# Patient Record
Sex: FEMALE | Race: BLACK OR AFRICAN AMERICAN | NOT HISPANIC OR LATINO | URBAN - METROPOLITAN AREA
[De-identification: names, ages, dates, MRNs, and addresses within clinical notes are randomized per-mention and may not be internally consistent; named-entity substitution may affect disease eponyms.]

---

## 2018-01-14 ENCOUNTER — EMERGENCY (EMERGENCY)
Facility: HOSPITAL | Age: 83
LOS: 0 days | Discharge: HOME | End: 2018-01-14
Attending: EMERGENCY MEDICINE
Payer: SELF-PAY

## 2018-01-14 VITALS
HEART RATE: 56 BPM | OXYGEN SATURATION: 99 % | SYSTOLIC BLOOD PRESSURE: 177 MMHG | TEMPERATURE: 98 F | DIASTOLIC BLOOD PRESSURE: 79 MMHG | RESPIRATION RATE: 18 BRPM

## 2018-01-14 VITALS
OXYGEN SATURATION: 98 % | TEMPERATURE: 98 F | SYSTOLIC BLOOD PRESSURE: 156 MMHG | DIASTOLIC BLOOD PRESSURE: 80 MMHG | RESPIRATION RATE: 17 BRPM | HEART RATE: 62 BPM

## 2018-01-14 DIAGNOSIS — M25.511 PAIN IN RIGHT SHOULDER: ICD-10-CM
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
## 2018-01-14 PROCEDURE — 99284 EMERGENCY DEPT VISIT MOD MDM: CPT | Mod: 25

## 2018-01-14 PROCEDURE — 73030 X-RAY EXAM OF SHOULDER: CPT | Mod: 26,RT

## 2018-01-14 PROCEDURE — 99053 MED SERV 10PM-8AM 24 HR FAC: CPT

## 2018-01-14 PROCEDURE — 73060 X-RAY EXAM OF HUMERUS: CPT | Mod: 26,RT

## 2018-01-14 RX ORDER — ACETAMINOPHEN 500 MG
650 TABLET ORAL ONCE
Refills: 0 | Status: COMPLETED | OUTPATIENT
Start: 2018-01-14 | End: 2018-01-14

## 2018-01-14 RX ADMIN — Medication 650 MILLIGRAM(S): at 08:33

## 2018-01-14 NOTE — ED PROVIDER NOTE - OBJECTIVE STATEMENT
81 y/o female hx htn p/w right shoulder/arm pain s/p mechanical fall at [facility]. Did not hit head, no loc, no a/c, no neck/back pain, no cp, sob, abd pain, or other extremity pain. Ambulatory since event. Difficulty moving arm/lifting.     ROS: No fever/chills. No eye pain/changes in vision, No ear pain/sore throat/dysphagia, No chest pain/palpitations. No SOB/cough/. No abdominal pain, N/V/D, no black/bloody bm. No dysuria/frequency/discharge, No headache. No Dizziness.    No rashes or breaks in skin. No numbness/tingling/weakness.

## 2018-01-14 NOTE — ED PROVIDER NOTE - PROGRESS NOTE DETAILS
Rios: pt with some improvement, no fracture/dislocation, limited rom, no sign compartment syndrome, spoke to ortho, will have pt f/u with Dr. Ochoa- pt to call today, possible mri, sling prn but given instructions to prevent frozen shoulder, supportive care stable for d/c.

## 2018-01-14 NOTE — ED PROVIDER NOTE - PHYSICAL EXAMINATION
Gen: No acute distress, non toxic  HEENT: Mucous membranes moist, pink conjunctivae, EOMI ncat.   CV: RRR, nl s1/s2.  Resp: CTAB, normal rate and effort  GI: Abdomen soft, NT, ND. No rebound, no guarding  : No CVAT  Neuro: A&O x 3, moving all 4 extremities  MSK: severe pain to right proximal UE, ?swelling/minor fullness. normal sensation, unable to lift arm due to pain, normal motor/sensation distally. no ttp over clavicle or other part of extremity  Skin: No rashes. intact and perfused.

## 2018-01-14 NOTE — ED PROVIDER NOTE - MEDICAL DECISION MAKING DETAILS
mechanical fall with right UE injury, r/o fracture, did not hit head, no headache or other pains. external sign of head trauma, no indication for head ct/cspine at this time.  xray r/o fracture. pain control.

## 2018-08-30 NOTE — ED ADULT TRIAGE NOTE - MEANS OF ARRIVAL
Before Your Surgery      Call your surgeon if there is any change in your health. This includes signs of a cold or flu (such as a sore throat, runny nose, cough, rash or fever).    Do not smoke, drink alcohol or take over the counter medicine (unless your surgeon or primary care doctor tells you to) for the 24 hours before and after surgery.    If you take prescribed drugs: Follow your doctor s orders about which medicines to take and which to stop until after surgery.    Eating and drinking prior to surgery: follow the instructions from your surgeon    Take a shower or bath the night before surgery. Use the soap your surgeon gave you to gently clean your skin. If you do not have soap from your surgeon, use your regular soap. Do not shave or scrub the surgery site.  Wear clean pajamas and have clean sheets on your bed.     QUIT SMOKING!!     -- Choose a quit date (within 1 month). Quitting smoking abruptly is more successful than gradually cutting back.   -- Tell everyone about it (friends, family, coworkers)   -- Think about when you smoke the most, and what you'll do during those times (eg when in the car, work breaks, etc)     Consider:   -- Start varenicline (Chantix) 1 week before your quit date   -- Start bupropion (Wellbutrin/Zyban) 1 week before your quit date -- Welbutrin 1 pill daily for 1 week then 1 pill twice daily      -- Stop smoking on quit date   -- Starting with quit date, use nicotine lozenges/gum as needed for cravings     -- Quit Plan - Call them in the next 1-2 weeks to help you quit smoking.                        [phone] ([phone]                        http://www.quitplan.com   -- http://smokefree.gov/    Bring your CPAP with to surgery.   
stretcher

## 2019-03-14 ENCOUNTER — APPOINTMENT (OUTPATIENT)
Dept: VASCULAR SURGERY | Facility: CLINIC | Age: 84
End: 2019-03-14
Payer: MEDICARE

## 2019-03-14 VITALS
DIASTOLIC BLOOD PRESSURE: 81 MMHG | BODY MASS INDEX: 34 KG/M2 | SYSTOLIC BLOOD PRESSURE: 164 MMHG | HEIGHT: 58 IN | HEART RATE: 81 BPM | WEIGHT: 162 LBS | TEMPERATURE: 98.2 F

## 2019-03-14 VITALS — HEART RATE: 65 BPM | SYSTOLIC BLOOD PRESSURE: 162 MMHG | DIASTOLIC BLOOD PRESSURE: 81 MMHG

## 2019-03-14 DIAGNOSIS — M79.89 OTHER SPECIFIED SOFT TISSUE DISORDERS: ICD-10-CM

## 2019-03-14 PROCEDURE — 99203 OFFICE O/P NEW LOW 30 MIN: CPT

## 2019-03-14 PROCEDURE — 93970 EXTREMITY STUDY: CPT

## 2019-04-01 ENCOUNTER — OTHER (OUTPATIENT)
Age: 84
End: 2019-04-01

## 2019-04-01 RX ORDER — AMLODIPINE BESYLATE 5 MG/1
TABLET ORAL
Refills: 0 | Status: ACTIVE | COMMUNITY

## 2019-04-01 RX ORDER — ALPRAZOLAM 0.5 MG/1
0.5 TABLET ORAL
Qty: 2 | Refills: 0 | Status: COMPLETED | COMMUNITY
Start: 2019-04-01 | End: 2019-04-02

## 2019-04-01 RX ORDER — CEPHALEXIN 500 MG/1
500 CAPSULE ORAL
Refills: 0 | Status: ACTIVE | COMMUNITY

## 2019-04-01 RX ORDER — SODIUM BICARBONATE 84 MG/ML
8.4 INJECTION, SOLUTION INTRAVENOUS
Qty: 5 | Refills: 0 | Status: COMPLETED | COMMUNITY
Start: 2019-04-01 | End: 2019-04-05

## 2019-04-01 RX ORDER — SODIUM CHLORIDE 9 G/ML
0.9 INJECTION, SOLUTION INTRAVENOUS
Qty: 500 | Refills: 0 | Status: COMPLETED | COMMUNITY
Start: 2019-04-01 | End: 2019-04-05

## 2019-04-01 RX ORDER — HYDROGEN PEROXIDE 2.65 ML/100ML
81 LIQUID ORAL; TOPICAL
Refills: 0 | Status: ACTIVE | COMMUNITY

## 2019-04-01 RX ORDER — IBUPROFEN 800 MG/1
TABLET, FILM COATED ORAL
Refills: 0 | Status: ACTIVE | COMMUNITY

## 2019-04-01 RX ORDER — LIDOCAINE HYDROCHLORIDE 10 MG/ML
1 INJECTION, SOLUTION INFILTRATION; PERINEURAL
Qty: 50 | Refills: 0 | Status: COMPLETED | COMMUNITY
Start: 2019-04-01 | End: 2019-04-05

## 2019-04-05 ENCOUNTER — APPOINTMENT (OUTPATIENT)
Dept: VASCULAR SURGERY | Facility: CLINIC | Age: 84
End: 2019-04-05
Payer: MEDICARE

## 2019-04-05 PROCEDURE — 36475 ENDOVENOUS RF 1ST VEIN: CPT | Mod: RT

## 2019-04-05 NOTE — PROCEDURE
[FreeTextEntry1] : right GSV RFA [FreeTextEntry3] : Procedural safety checklist and time out completed:\par Confirmed patient identification (Patient Name, , and/or medical record number including when possible affirmation by patient or parent/family/other.\par Confirmed procedure with the patient. Consent present, accurate and signed. \par Confirmed special equipment and supplies are present.\par Sterility confirmed. Position verified. \par Site/ side is marked and visible and confirmed. \par Procedure confirmed by consent. Accurate consent including side and site.\par Review of medical records noting correct procedure including site and side.\par MD/PA verifies presence and review of imaging studies and or written report of imaging studies.\par Specify equipment are available for the planned procedure.\par MD/PA has marked the patient's procedural site and side.\par Agreement on the procedure to be performed\par Time out completed.\par All of the above has been confirmed by the team.\par All patient-specific concerns have been addressed\par \par Indication: Right lower extremity varicose veins with leg pain, leg swelling, and leg cramping.  Venous insufficiency/ reflux.\par \par Procedure: radiofrequency ablation of the right great saphenous vein. \par 	\par [Ms. MAGALI CARRION is a 83 year old F with a history of right lower extremity varicose veins previously seen in the office.  Ultrasound examination demonstrated venous insufficiency. A trial of compression stockings, exercise, elevation, and pain medication was attempted without relief and definitive treatment with radiofrequency ablation was offered. \par \par The patient has come for radiofrequency ablation treatment of the right great saphenous vein. \par I have discussed the risks of the procedure at length with the patient. The risks discussed were inclusive of but not limited to infection, irritation at the site of infiltration of local anesthesia, and also rare risk of deep venous thrombosis and pulmonary emboli. The patient agrees to proceed with the procedure. \par The patient was escorted into the procedure room and a time out called.\par The entire limb was prepped and draped in sterile fashion. The RF fiber was placed on the sterile field and connected by a sterile cable. Actuation, temperature, and impedance testing were performed to ensure that all components were connected and operating properly. \par The patient was placed on the procedure table and local anesthesia was instilled in the skin overlying the access site. Under ultrasound guidance, the vein was punctured with a micropuncture needle, using the anterior wall technique. A guide wire was now introduced through the needle, and the needle was then exchanged over the guide wire for a 7F sheath. The guide wire was removed and the RF probe was then placed into the right great saphenous vein through the sheath and position confirmed using ultrasound guidance. After the RF probe position was verified by ultrasound, tumescent anesthesia consisting of normal saline, 1% lidocaine with 8.4% sodium bicarbonate was infiltrated, under ultrasound guidance, precisely into the perivenous compartment along the entire length of the vein until a “halo” of fluid was noted around the vein. After RF probe position was again confirmed with ultrasound imaging, RF energy was applied. The probe was gradually and carefully withdrawn at a rate of 6.5cm/20seconds. \par \par The total volume injected was 100_cc. \par Total treatment time was 80 seconds.\par Treatment length was 20 cm and 4 cycles of RF performed using the 7 cm probe. \par The probe is 3.5 cm from the SFJ.\par \par Estimated Blood Loss: minimal\par Repeat ultrasound of the treated vein was performed confirming successful treatment. The catheter and sheath were withdrawn and hemostasis established with direct pressure. After assuring hemostasis, a sterile 4x4 was placed on the access site and an ACE compression wrap was applied. Patient tolerated procedure well. Patient was given post-procedure instructions and follow up appointment was scheduled.\par \par \par

## 2019-04-08 ENCOUNTER — OTHER (OUTPATIENT)
Age: 84
End: 2019-04-08

## 2019-04-08 RX ORDER — SODIUM CHLORIDE 9 G/ML
0.9 INJECTION, SOLUTION INTRAVENOUS
Qty: 500 | Refills: 0 | Status: ACTIVE | COMMUNITY
Start: 2019-04-08 | End: 1900-01-01

## 2019-04-08 RX ORDER — SODIUM BICARBONATE 84 MG/ML
8.4 INJECTION, SOLUTION INTRAVENOUS
Qty: 5 | Refills: 0 | Status: ACTIVE | COMMUNITY
Start: 2019-04-08 | End: 1900-01-01

## 2019-04-08 RX ORDER — LIDOCAINE HYDROCHLORIDE 10 MG/ML
1 INJECTION, SOLUTION INFILTRATION; PERINEURAL
Qty: 50 | Refills: 0 | Status: ACTIVE | COMMUNITY
Start: 2019-04-08 | End: 1900-01-01

## 2019-04-12 ENCOUNTER — APPOINTMENT (OUTPATIENT)
Dept: VASCULAR SURGERY | Facility: CLINIC | Age: 84
End: 2019-04-12
Payer: MEDICARE

## 2019-04-12 PROCEDURE — 93971 EXTREMITY STUDY: CPT | Mod: 59

## 2019-04-12 PROCEDURE — 36475 ENDOVENOUS RF 1ST VEIN: CPT | Mod: LT

## 2019-04-12 NOTE — PROCEDURE
[FreeTextEntry1] : left GSV RFA [FreeTextEntry3] : Patient is s/p right GSV RFA. Post procedure scan done today shows successful closure of the right GSV without evidence of DVT. She is here today for the lef GSV RFA.\par \par Procedural safety checklist and time out completed:\par Confirmed patient identification (Patient Name, , and/or medical record number including when possible affirmation by patient or parent/family/other.\par Confirmed procedure with the patient. Consent present, accurate and signed. \par Confirmed special equipment and supplies are present.\par Sterility confirmed. Position verified. \par Site/ side is marked and visible and confirmed. \par Procedure confirmed by consent. Accurate consent including side and site.\par Review of medical records noting correct procedure including site and side.\par MD/PA verifies presence and review of imaging studies and or written report of imaging studies.\par Specify equipment are available for the planned procedure.\par MD/PA has marked the patient's procedural site and side.\par Agreement on the procedure to be performed\par Time out completed.\par All of the above has been confirmed by the team.\par All patient-specific concerns have been addressed\par \par Indication: Left lower extremity varicose veins with leg pain, leg swelling, and leg cramping.  Venous insufficiency/ reflux.\par \par Procedure: radiofrequency ablation of the left great saphenous vein. \par 	\par [Ms. MAGALI CARRION is a 83 year old F with a history of left lower extremity varicose veins previously seen in the office.  Ultrasound examination demonstrated venous insufficiency. A trial of compression stockings, exercise, elevation, and pain medication was attempted without relief and definitive treatment with radiofrequency ablation was offered. \par \par The patient has come for radiofrequency ablation treatment of the left great saphenous vein. \par I have discussed the risks of the procedure at length with the patient. The risks discussed were inclusive of but not limited to infection, irritation at the site of infiltration of local anesthesia, and also rare risk of deep venous thrombosis and pulmonary emboli. The patient agrees to proceed with the procedure. \par The patient was escorted into the procedure room and a time out called.\par The entire limb was prepped and draped in sterile fashion. The RF fiber was placed on the sterile field and connected by a sterile cable. Actuation, temperature, and impedance testing were performed to ensure that all components were connected and operating properly. \par The patient was placed on the procedure table and local anesthesia was instilled in the skin overlying the access site. Under ultrasound guidance, the vein was punctured with a micropuncture needle, using the anterior wall technique. A guide wire was now introduced through the needle, and the needle was then exchanged over the guide wire for a 7F sheath. The guide wire was removed and the RF probe was then placed into the left great saphenous vein through the sheath and position confirmed using ultrasound guidance. After the RF probe position was verified by ultrasound, tumescent anesthesia consisting of normal saline, 1% lidocaine with 8.4% sodium bicarbonate was infiltrated, under ultrasound guidance, precisely into the perivenous compartment along the entire length of the vein until a “halo” of fluid was noted around the vein. After RF probe position was again confirmed with ultrasound imaging, RF energy was applied. The probe was gradually and carefully withdrawn at a rate of 6.5cm/20seconds. \par \par The total volume injected was 100 cc. \par Total treatment time was 60_seconds.\par Treatment length was 13 cm and 3 cycles of RF performed using the 7 cm probe. \par The probe is 3.9 cm from the SFJ.\par \par Estimated Blood Loss: minimal\par Repeat ultrasound of the treated vein was performed confirming successful treatment. The catheter and sheath were withdrawn and hemostasis established with direct pressure. After assuring hemostasis, a sterile 4x4 was placed on the access site and an ACE compression wrap was applied. Patient tolerated procedure well. Patient was given post-procedure instructions and follow up appointment was scheduled.\par \par \par

## 2019-04-15 ENCOUNTER — APPOINTMENT (OUTPATIENT)
Dept: VASCULAR SURGERY | Facility: CLINIC | Age: 84
End: 2019-04-15
Payer: MEDICARE

## 2019-04-15 PROCEDURE — 93971 EXTREMITY STUDY: CPT

## 2019-05-10 ENCOUNTER — APPOINTMENT (OUTPATIENT)
Dept: VASCULAR SURGERY | Facility: CLINIC | Age: 84
End: 2019-05-10
Payer: MEDICARE

## 2019-05-10 VITALS
HEART RATE: 73 BPM | WEIGHT: 162 LBS | SYSTOLIC BLOOD PRESSURE: 150 MMHG | BODY MASS INDEX: 34 KG/M2 | HEIGHT: 58 IN | DIASTOLIC BLOOD PRESSURE: 83 MMHG | TEMPERATURE: 98.1 F

## 2019-05-10 DIAGNOSIS — I83.893 VARICOSE VEINS OF BILATERAL LOWER EXTREMITIES WITH OTHER COMPLICATIONS: ICD-10-CM

## 2019-05-10 PROCEDURE — 93970 EXTREMITY STUDY: CPT

## 2019-05-10 PROCEDURE — 99212 OFFICE O/P EST SF 10 MIN: CPT

## 2019-05-29 NOTE — HISTORY OF PRESENT ILLNESS
[FreeTextEntry1] : 83 year old female presents today for follow up s/p bilateral RFA. Post-ablation ultrasounds demonstrated successful closure of GSVs and no resultant DVT. Patient reports that after the procedures her legs appeared to have improved but she suddenly started having swelling and pain again the beginning of this week. She now has difficulty wearing her shoes. She reports swelling is worse at the end of the day and unimproved with leg elevation. She denies any sort of inciting event including trauma, prolonged activity, recent illness, or travel. Denies claudication, rest pain, and tissue loss. \par \par PMH: HTN, arthritis, Alzheimers \par \par Bilateral VLE from today negative for DVT. GSV is closed bilaterally. No other reflux noted. \par \par

## 2019-05-29 NOTE — ASSESSMENT
[FreeTextEntry1] : 83 year old female presents today for follow up s/p bilateral RFA. Post-ablation ultrasounds demonstrated successful closure of GSVs and no resultant DVT. Patient reports that after the procedures her legs appeared to have improved but she suddenly started having swelling and pain again the beginning of this week. She now has difficulty wearing her shoes. She reports swelling is worse at the end of the day and unimproved with leg elevation. She denies any sort of inciting event including trauma, prolonged activity, recent illness, or travel. Denies claudication, rest pain, and tissue loss. \par \par PMH: HTN, arthritis, Alzheimers \par \par Bilateral VLE from today negative for DVT. GSV is closed bilaterally. No other reflux noted. \par \par On exam, the legs are warm with normal capillary refill. Skin is intact. There is 1+ pitting edema bilaterally. RFA incision are healed bilaterally. \par \par Recommend lymphedema therapy. Will refer to [facility]. Follow up as needed. \par

## 2019-05-29 NOTE — PHYSICAL EXAM
[Ankle Swelling (On Exam)] : present [Alert] : alert [Ankle Swelling Bilaterally] : bilaterally  [Oriented to Person] : oriented to person [Oriented to Place] : oriented to place [Oriented to Time] : oriented to time [Calm] : calm [de-identified] : NAD [de-identified] : unlabored [FreeTextEntry1] : Legs warm with normal capillary refill. 1+ pitting edema bilaterally.  [de-identified] : LE intact. RFA incisions intact

## 2019-05-29 NOTE — REVIEW OF SYSTEMS
[Lower Ext Edema] : lower extremity edema [Negative] : Constitutional [Leg Claudication] : no intermittent leg claudication [Shortness Of Breath] : no shortness of breath [Abdominal Pain] : no abdominal pain [Difficulty Walking] : no difficulty walking [FreeTextEntry9] : bilateral leg pain

## 2019-05-29 NOTE — ADDENDUM
[FreeTextEntry1] : Patient has primary lymphedema. Patient has been compliant with conservative therapies including compression and elevation for more than 30 days. Early signs of hyperpigmentation is noted.\par \par \par

## 2020-01-08 ENCOUNTER — INPATIENT (INPATIENT)
Facility: HOSPITAL | Age: 85
LOS: 4 days | Discharge: HOME | End: 2020-01-13
Attending: GENERAL ACUTE CARE HOSPITAL | Admitting: GENERAL ACUTE CARE HOSPITAL
Payer: MEDICARE

## 2020-01-08 VITALS
SYSTOLIC BLOOD PRESSURE: 151 MMHG | RESPIRATION RATE: 20 BRPM | DIASTOLIC BLOOD PRESSURE: 79 MMHG | HEART RATE: 65 BPM | WEIGHT: 169.98 LBS | OXYGEN SATURATION: 97 % | HEIGHT: 59 IN | TEMPERATURE: 99 F

## 2020-01-08 LAB
ALBUMIN SERPL ELPH-MCNC: 3.6 G/DL — SIGNIFICANT CHANGE UP (ref 3.3–5)
ALP SERPL-CCNC: 95 U/L — SIGNIFICANT CHANGE UP (ref 40–120)
ALT FLD-CCNC: 17 U/L — SIGNIFICANT CHANGE UP (ref 12–78)
ANION GAP SERPL CALC-SCNC: 5 MMOL/L — SIGNIFICANT CHANGE UP (ref 5–17)
APPEARANCE UR: CLEAR — SIGNIFICANT CHANGE UP
AST SERPL-CCNC: 34 U/L — SIGNIFICANT CHANGE UP (ref 15–37)
BACTERIA # UR AUTO: ABNORMAL
BASOPHILS # BLD AUTO: 0.03 K/UL — SIGNIFICANT CHANGE UP (ref 0–0.2)
BASOPHILS NFR BLD AUTO: 0.8 % — SIGNIFICANT CHANGE UP (ref 0–2)
BILIRUB SERPL-MCNC: 0.7 MG/DL — SIGNIFICANT CHANGE UP (ref 0.2–1.2)
BILIRUB UR-MCNC: NEGATIVE — SIGNIFICANT CHANGE UP
BUN SERPL-MCNC: 12 MG/DL — SIGNIFICANT CHANGE UP (ref 7–23)
CALCIUM SERPL-MCNC: 9 MG/DL — SIGNIFICANT CHANGE UP (ref 8.5–10.1)
CHLORIDE SERPL-SCNC: 106 MMOL/L — SIGNIFICANT CHANGE UP (ref 96–108)
CO2 SERPL-SCNC: 29 MMOL/L — SIGNIFICANT CHANGE UP (ref 22–31)
COLOR SPEC: YELLOW — SIGNIFICANT CHANGE UP
CREAT SERPL-MCNC: 1.17 MG/DL — SIGNIFICANT CHANGE UP (ref 0.5–1.3)
DIFF PNL FLD: ABNORMAL
EOSINOPHIL # BLD AUTO: 0.19 K/UL — SIGNIFICANT CHANGE UP (ref 0–0.5)
EOSINOPHIL NFR BLD AUTO: 4.8 % — SIGNIFICANT CHANGE UP (ref 0–6)
EPI CELLS # UR: SIGNIFICANT CHANGE UP
FLU A RESULT: SIGNIFICANT CHANGE UP
FLU A RESULT: SIGNIFICANT CHANGE UP
FLUAV AG NPH QL: SIGNIFICANT CHANGE UP
FLUBV AG NPH QL: SIGNIFICANT CHANGE UP
GLUCOSE SERPL-MCNC: 94 MG/DL — SIGNIFICANT CHANGE UP (ref 70–99)
GLUCOSE UR QL: NEGATIVE MG/DL — SIGNIFICANT CHANGE UP
HCT VFR BLD CALC: 44.1 % — SIGNIFICANT CHANGE UP (ref 34.5–45)
HGB BLD-MCNC: 13.6 G/DL — SIGNIFICANT CHANGE UP (ref 11.5–15.5)
IMM GRANULOCYTES NFR BLD AUTO: 0.3 % — SIGNIFICANT CHANGE UP (ref 0–1.5)
KETONES UR-MCNC: NEGATIVE — SIGNIFICANT CHANGE UP
LEUKOCYTE ESTERASE UR-ACNC: ABNORMAL
LYMPHOCYTES # BLD AUTO: 1.28 K/UL — SIGNIFICANT CHANGE UP (ref 1–3.3)
LYMPHOCYTES # BLD AUTO: 32.4 % — SIGNIFICANT CHANGE UP (ref 13–44)
MCHC RBC-ENTMCNC: 27.9 PG — SIGNIFICANT CHANGE UP (ref 27–34)
MCHC RBC-ENTMCNC: 30.8 GM/DL — LOW (ref 32–36)
MCV RBC AUTO: 90.4 FL — SIGNIFICANT CHANGE UP (ref 80–100)
MONOCYTES # BLD AUTO: 0.48 K/UL — SIGNIFICANT CHANGE UP (ref 0–0.9)
MONOCYTES NFR BLD AUTO: 12.2 % — SIGNIFICANT CHANGE UP (ref 2–14)
NEUTROPHILS # BLD AUTO: 1.96 K/UL — SIGNIFICANT CHANGE UP (ref 1.8–7.4)
NEUTROPHILS NFR BLD AUTO: 49.5 % — SIGNIFICANT CHANGE UP (ref 43–77)
NITRITE UR-MCNC: NEGATIVE — SIGNIFICANT CHANGE UP
NRBC # BLD: 0 /100 WBCS — SIGNIFICANT CHANGE UP (ref 0–0)
PH UR: 7 — SIGNIFICANT CHANGE UP (ref 5–8)
PLATELET # BLD AUTO: 246 K/UL — SIGNIFICANT CHANGE UP (ref 150–400)
POTASSIUM SERPL-MCNC: 4.4 MMOL/L — SIGNIFICANT CHANGE UP (ref 3.5–5.3)
POTASSIUM SERPL-SCNC: 4.4 MMOL/L — SIGNIFICANT CHANGE UP (ref 3.5–5.3)
PROT SERPL-MCNC: 7.8 GM/DL — SIGNIFICANT CHANGE UP (ref 6–8.3)
PROT UR-MCNC: 15 MG/DL
RBC # BLD: 4.88 M/UL — SIGNIFICANT CHANGE UP (ref 3.8–5.2)
RBC # FLD: 13.3 % — SIGNIFICANT CHANGE UP (ref 10.3–14.5)
RBC CASTS # UR COMP ASSIST: SIGNIFICANT CHANGE UP /HPF (ref 0–4)
RSV RESULT: DETECTED
RSV RNA RESP QL NAA+PROBE: DETECTED
SODIUM SERPL-SCNC: 140 MMOL/L — SIGNIFICANT CHANGE UP (ref 135–145)
SP GR SPEC: 1 — LOW (ref 1.01–1.02)
UROBILINOGEN FLD QL: NEGATIVE MG/DL — SIGNIFICANT CHANGE UP
WBC # BLD: 3.95 K/UL — SIGNIFICANT CHANGE UP (ref 3.8–10.5)
WBC # FLD AUTO: 3.95 K/UL — SIGNIFICANT CHANGE UP (ref 3.8–10.5)
WBC UR QL: SIGNIFICANT CHANGE UP

## 2020-01-08 PROCEDURE — 71045 X-RAY EXAM CHEST 1 VIEW: CPT | Mod: 26

## 2020-01-08 PROCEDURE — 99285 EMERGENCY DEPT VISIT HI MDM: CPT

## 2020-01-08 RX ORDER — SODIUM CHLORIDE 9 MG/ML
1000 INJECTION, SOLUTION INTRAVENOUS ONCE
Refills: 0 | Status: COMPLETED | OUTPATIENT
Start: 2020-01-08 | End: 2020-01-08

## 2020-01-08 RX ORDER — ACETAMINOPHEN 500 MG
2 TABLET ORAL
Qty: 40 | Refills: 0
Start: 2020-01-08 | End: 2020-01-12

## 2020-01-08 RX ORDER — ACETAMINOPHEN 500 MG
975 TABLET ORAL ONCE
Refills: 0 | Status: COMPLETED | OUTPATIENT
Start: 2020-01-08 | End: 2020-01-09

## 2020-01-08 RX ORDER — CEFUROXIME AXETIL 250 MG
1 TABLET ORAL
Qty: 20 | Refills: 0
Start: 2020-01-08 | End: 2020-01-17

## 2020-01-08 RX ORDER — ALBUTEROL 90 UG/1
2.5 AEROSOL, METERED ORAL ONCE
Refills: 0 | Status: COMPLETED | OUTPATIENT
Start: 2020-01-08 | End: 2020-01-08

## 2020-01-08 RX ADMIN — ALBUTEROL 2.5 MILLIGRAM(S): 90 AEROSOL, METERED ORAL at 20:18

## 2020-01-08 RX ADMIN — Medication 200 MILLIGRAM(S): at 20:19

## 2020-01-08 RX ADMIN — SODIUM CHLORIDE 1000 MILLILITER(S): 9 INJECTION, SOLUTION INTRAVENOUS at 20:19

## 2020-01-08 RX ADMIN — SODIUM CHLORIDE 1000 MILLILITER(S): 9 INJECTION, SOLUTION INTRAVENOUS at 21:19

## 2020-01-08 NOTE — ED PROVIDER NOTE - PROGRESS NOTE DETAILS
Results reported to patient--grossly benign, labs show rsv, UTI, otherwise benign results   Pt. reports feeling better after meds  pt. agrees to f/u with primary care outpt.  pt. understands to return to ED if symptoms worsen; will d/c with meds family explains, pt. lives alone, can't care for self, has alzheimers and can't  meds  Pt. is additionally Rsv positive and has a UTI  will admit to Dr. Correa

## 2020-01-08 NOTE — ED ADULT NURSE NOTE - OBJECTIVE STATEMENT
85y/o female PMHx HTN presents to the ED with flu like symptoms patient reports chills, wet cough with yellow sputum since saturday. advil given at 4pm, slight b/l wheezing, denies respiratory history 85y/o female AAOX2  PMHx HTN presents to the ED with flu like symptoms patient reports chills, wet cough with yellow sputum since saturday. advil given at 4pm, slight b/l wheezing, denies respiratory history

## 2020-01-08 NOTE — ED ADULT NURSE NOTE - ED STAT RN HANDOFF DETAILS
Report received from RN at this time. Assessment available on [facility]. will continue to monitor

## 2020-01-08 NOTE — ED ADULT TRIAGE NOTE - CHIEF COMPLAINT QUOTE
PT C/O COUGH , chills, body aches, and wheezing started on Sunday , hx dementia, pt received aleve at 4pm for fever

## 2020-01-08 NOTE — ED PROVIDER NOTE - OBJECTIVE STATEMENT
83 yo F with flu symptoms, presents from home, brought in with family, who explain she's had a cough, chills, fever, congestion, body aches, headache, chills for  4 days.  + sick contact.  Pt. has baseline dementia and has difficulty giving history.  Pt. felt likely she was wheezing.  No other complaints or inciting event.  Pt. feels well otherwise.   ROS: negative for chest pain, shortness of breath, abd pain, nausea, vomiting, diarrhea, rash, paresthesia, and focal weakness--all other systems reviewed are negative.   PMH: alzheimer's, HTN, anemia; Meds: folic acid, amlopidine, ASA; SH: Denies smoking/drinking/drug use

## 2020-01-08 NOTE — ED ADULT NURSE REASSESSMENT NOTE - NS ED NURSE REASSESS COMMENT FT1
AO X3 , VITAL RE ASSESSED, NO SIGN OF DISTRESS , FAMILY BY HER SIDE , WILL CONTINUE MONITORING TILL PLACED IN THE MAIN

## 2020-01-08 NOTE — ED PROVIDER NOTE - PATIENT PORTAL LINK FT
You can access the FollowMyHealth Patient Portal offered by [facility] by registering at the following website: [email or web address]. By joining [employer] [facility] FollowMyHealth portal, you will also be able to view your health information using other applications (apps) compatible with our system.

## 2020-01-08 NOTE — ED ADULT NURSE NOTE - ED STAT RN HANDOFF DETAILS 2
Report endorsed to oncoming hold RN. Safety checks compld this shift/Safety rounds completed hourly.  IV sites checked Q2+remains WDL. Meds given as ord with no s/s of adverse RXNs. Fall +skin precs in place. Any issues endorsed to oncoming RN for follow up.

## 2020-01-08 NOTE — ED PROVIDER NOTE - CLINICAL SUMMARY MEDICAL DECISION MAKING FREE TEXT BOX
85 yo F with likely flu, doubt pna, uti  -basic labs, ua, cx, flu swab, cxr, iv, LR hydration bolus, tylenol, albuterol x1, robitussin  -f/u results, reeval

## 2020-01-08 NOTE — ED PROVIDER NOTE - PHYSICAL EXAMINATION
Vitals: WNL  Gen: AAOx2, NAD, sitting comfortably in stretcher, calm, cooperative, non-toxic   Head: ncat, perrla, eomi b/l  Neck: supple, no lymphadenopathy, no midline deviation  Heart: rrr, no m/r/g  Lungs: CTA b/l, no rales/ronchi/wheezes  Abd: soft, nontender, non-distended, no rebound or guarding  Ext: no clubbing/cyanosis/edema  Neuro: sensation and muscle strength intact b/l, steady gait

## 2020-01-08 NOTE — ED PROVIDER NOTE - CARE PLAN
Principal Discharge DX:	Influenza Principal Discharge DX:	RSV infection  Secondary Diagnosis:	Acute cystitis without hematuria Principal Discharge DX:	RSV infection  Secondary Diagnosis:	Acute cystitis without hematuria  Secondary Diagnosis:	Altered mental status, unspecified altered mental status type

## 2020-01-09 DIAGNOSIS — G30.9 ALZHEIMER'S DISEASE, UNSPECIFIED: ICD-10-CM

## 2020-01-09 DIAGNOSIS — N39.0 URINARY TRACT INFECTION, SITE NOT SPECIFIED: ICD-10-CM

## 2020-01-09 DIAGNOSIS — Z90.49 ACQUIRED ABSENCE OF OTHER SPECIFIED PARTS OF DIGESTIVE TRACT: Chronic | ICD-10-CM

## 2020-01-09 DIAGNOSIS — I10 ESSENTIAL (PRIMARY) HYPERTENSION: ICD-10-CM

## 2020-01-09 DIAGNOSIS — B97.4 RESPIRATORY SYNCYTIAL VIRUS AS THE CAUSE OF DISEASES CLASSIFIED ELSEWHERE: ICD-10-CM

## 2020-01-09 LAB
ANION GAP SERPL CALC-SCNC: 6 MMOL/L — SIGNIFICANT CHANGE UP (ref 5–17)
BASOPHILS # BLD AUTO: 0.03 K/UL — SIGNIFICANT CHANGE UP (ref 0–0.2)
BASOPHILS NFR BLD AUTO: 0.8 % — SIGNIFICANT CHANGE UP (ref 0–2)
BUN SERPL-MCNC: 9 MG/DL — SIGNIFICANT CHANGE UP (ref 7–23)
CALCIUM SERPL-MCNC: 8.8 MG/DL — SIGNIFICANT CHANGE UP (ref 8.5–10.1)
CHLORIDE SERPL-SCNC: 106 MMOL/L — SIGNIFICANT CHANGE UP (ref 96–108)
CO2 SERPL-SCNC: 27 MMOL/L — SIGNIFICANT CHANGE UP (ref 22–31)
CREAT SERPL-MCNC: 0.89 MG/DL — SIGNIFICANT CHANGE UP (ref 0.5–1.3)
EOSINOPHIL # BLD AUTO: 0.08 K/UL — SIGNIFICANT CHANGE UP (ref 0–0.5)
EOSINOPHIL NFR BLD AUTO: 2.1 % — SIGNIFICANT CHANGE UP (ref 0–6)
GLUCOSE SERPL-MCNC: 92 MG/DL — SIGNIFICANT CHANGE UP (ref 70–99)
HCT VFR BLD CALC: 41.1 % — SIGNIFICANT CHANGE UP (ref 34.5–45)
HGB BLD-MCNC: 12.7 G/DL — SIGNIFICANT CHANGE UP (ref 11.5–15.5)
IMM GRANULOCYTES NFR BLD AUTO: 0.3 % — SIGNIFICANT CHANGE UP (ref 0–1.5)
LYMPHOCYTES # BLD AUTO: 0.69 K/UL — LOW (ref 1–3.3)
LYMPHOCYTES # BLD AUTO: 18.4 % — SIGNIFICANT CHANGE UP (ref 13–44)
MCHC RBC-ENTMCNC: 27.4 PG — SIGNIFICANT CHANGE UP (ref 27–34)
MCHC RBC-ENTMCNC: 30.9 GM/DL — LOW (ref 32–36)
MCV RBC AUTO: 88.6 FL — SIGNIFICANT CHANGE UP (ref 80–100)
MONOCYTES # BLD AUTO: 0.34 K/UL — SIGNIFICANT CHANGE UP (ref 0–0.9)
MONOCYTES NFR BLD AUTO: 9.1 % — SIGNIFICANT CHANGE UP (ref 2–14)
NEUTROPHILS # BLD AUTO: 2.59 K/UL — SIGNIFICANT CHANGE UP (ref 1.8–7.4)
NEUTROPHILS NFR BLD AUTO: 69.3 % — SIGNIFICANT CHANGE UP (ref 43–77)
NRBC # BLD: 0 /100 WBCS — SIGNIFICANT CHANGE UP (ref 0–0)
PLATELET # BLD AUTO: 244 K/UL — SIGNIFICANT CHANGE UP (ref 150–400)
POTASSIUM SERPL-MCNC: 3.5 MMOL/L — SIGNIFICANT CHANGE UP (ref 3.5–5.3)
POTASSIUM SERPL-SCNC: 3.5 MMOL/L — SIGNIFICANT CHANGE UP (ref 3.5–5.3)
PROCALCITONIN SERPL-MCNC: 0.06 NG/ML — SIGNIFICANT CHANGE UP (ref 0.02–0.1)
RBC # BLD: 4.64 M/UL — SIGNIFICANT CHANGE UP (ref 3.8–5.2)
RBC # FLD: 13.3 % — SIGNIFICANT CHANGE UP (ref 10.3–14.5)
SODIUM SERPL-SCNC: 139 MMOL/L — SIGNIFICANT CHANGE UP (ref 135–145)
WBC # BLD: 3.74 K/UL — LOW (ref 3.8–10.5)
WBC # FLD AUTO: 3.74 K/UL — LOW (ref 3.8–10.5)

## 2020-01-09 PROCEDURE — 99223 1ST HOSP IP/OBS HIGH 75: CPT

## 2020-01-09 RX ORDER — CEFTRIAXONE 500 MG/1
INJECTION, POWDER, FOR SOLUTION INTRAMUSCULAR; INTRAVENOUS
Refills: 0 | Status: DISCONTINUED | OUTPATIENT
Start: 2020-01-09 | End: 2020-01-09

## 2020-01-09 RX ORDER — HALOPERIDOL DECANOATE 100 MG/ML
2 INJECTION INTRAMUSCULAR ONCE
Refills: 0 | Status: COMPLETED | OUTPATIENT
Start: 2020-01-09 | End: 2020-01-09

## 2020-01-09 RX ORDER — HEPARIN SODIUM 5000 [USP'U]/ML
5000 INJECTION INTRAVENOUS; SUBCUTANEOUS EVERY 12 HOURS
Refills: 0 | Status: DISCONTINUED | OUTPATIENT
Start: 2020-01-09 | End: 2020-01-13

## 2020-01-09 RX ORDER — IPRATROPIUM/ALBUTEROL SULFATE 18-103MCG
3 AEROSOL WITH ADAPTER (GRAM) INHALATION EVERY 6 HOURS
Refills: 0 | Status: DISCONTINUED | OUTPATIENT
Start: 2020-01-09 | End: 2020-01-13

## 2020-01-09 RX ORDER — SODIUM CHLORIDE 9 MG/ML
1000 INJECTION INTRAMUSCULAR; INTRAVENOUS; SUBCUTANEOUS
Refills: 0 | Status: DISCONTINUED | OUTPATIENT
Start: 2020-01-09 | End: 2020-01-09

## 2020-01-09 RX ORDER — AMLODIPINE BESYLATE 2.5 MG/1
5 TABLET ORAL DAILY
Refills: 0 | Status: DISCONTINUED | OUTPATIENT
Start: 2020-01-09 | End: 2020-01-11

## 2020-01-09 RX ORDER — CEFTRIAXONE 500 MG/1
1000 INJECTION, POWDER, FOR SOLUTION INTRAMUSCULAR; INTRAVENOUS ONCE
Refills: 0 | Status: COMPLETED | OUTPATIENT
Start: 2020-01-09 | End: 2020-01-09

## 2020-01-09 RX ADMIN — HEPARIN SODIUM 5000 UNIT(S): 5000 INJECTION INTRAVENOUS; SUBCUTANEOUS at 05:57

## 2020-01-09 RX ADMIN — Medication 3 MILLILITER(S): at 11:37

## 2020-01-09 RX ADMIN — HALOPERIDOL DECANOATE 2 MILLIGRAM(S): 100 INJECTION INTRAMUSCULAR at 02:33

## 2020-01-09 RX ADMIN — AMLODIPINE BESYLATE 5 MILLIGRAM(S): 2.5 TABLET ORAL at 05:57

## 2020-01-09 RX ADMIN — HEPARIN SODIUM 5000 UNIT(S): 5000 INJECTION INTRAVENOUS; SUBCUTANEOUS at 18:22

## 2020-01-09 RX ADMIN — CEFTRIAXONE 100 MILLIGRAM(S): 500 INJECTION, POWDER, FOR SOLUTION INTRAMUSCULAR; INTRAVENOUS at 03:07

## 2020-01-09 RX ADMIN — Medication 3 MILLILITER(S): at 05:58

## 2020-01-09 RX ADMIN — Medication 3 MILLILITER(S): at 18:44

## 2020-01-09 RX ADMIN — SODIUM CHLORIDE 100 MILLILITER(S): 9 INJECTION INTRAMUSCULAR; INTRAVENOUS; SUBCUTANEOUS at 05:58

## 2020-01-09 RX ADMIN — Medication 975 MILLIGRAM(S): at 22:05

## 2020-01-09 RX ADMIN — Medication 975 MILLIGRAM(S): at 21:32

## 2020-01-09 NOTE — H&P ADULT - NSHPPHYSICALEXAM_GEN_ALL_CORE
T(C): 37.3 (08 Jan 2020 23:30), Max: 37.3 (08 Jan 2020 23:30)  T(F): 99.1 (08 Jan 2020 23:30), Max: 99.1 (08 Jan 2020 23:30)  HR: 85 (08 Jan 2020 23:30) (58 - 85)  BP: 156/85 (08 Jan 2020 23:30) (138/78 - 156/85)  BP(mean): 112 (08 Jan 2020 19:33) (112 - 112)  RR: 19 (08 Jan 2020 23:30) (17 - 20)  SpO2: 96% (08 Jan 2020 23:30) (96% - 97%)    PHYSICAL EXAM:  GENERAL: NAD, well-groomed, well-developed, pleasant  HEAD:  Atraumatic, Normocephalic  EYES: EOMI, PERRLA, conjunctiva and sclera clear  ENMT: No tonsillar erythema, exudates, or enlargement; Moist mucous membranes,  No lesions  NECK: Supple, No JVD, Normal thyroid  NERVOUS SYSTEM:  Alert & Oriented X2, CN 2-12 intact, no focal deficits  CHEST/LUNG: Clear to percussion bilaterally; No rales, rhonchi, wheezing, or rubs  HEART: Regular rate and rhythm; No murmurs, rubs, or gallops  ABDOMEN: Soft, Nontender, Nondistended; Bowel sounds present  EXTREMITIES:  + Peripheral Pulses, No clubbing, cyanosis, or edema  LYMPH: No lymphadenopathy noted  SKIN: No rashes or lesions

## 2020-01-09 NOTE — H&P ADULT - HISTORY OF PRESENT ILLNESS
85 y/o Female PMH alzheimer's, HTN, anemia presents with flu symptoms  from home, brought in with family, who explain she's had a cough, chills, fever, congestion, body aches, headache, chills for  4 days.  Has + sick contact.  Pt. has baseline dementia and has difficulty giving history.  Pt. felt likely she was wheezing.  No other complaints or inciting event.  Pt. feels well otherwise. She denies CP, SOB, wheezing, headache at this time.

## 2020-01-09 NOTE — H&P ADULT - ASSESSMENT
85 y/o Female PMH alzheimer's, HTN, anemia presents with flu symptoms  from home, brought in with family, who explain she's had a cough, chills, fever, congestion, body aches, headache, chills for  4 days.  Has + sick contact.  Pt. has baseline dementia and has difficulty giving history.  Pt. felt likely she was wheezing.  No other complaints or inciting event.  Pt. feels well otherwise. She denies CP, SOB, wheezing, headache at this time. Pt found to have RSV infection and UTI.  IMPROVE VTE Individual Risk Assessment          RISK                                                          Points    [  ] Previous VTE                                                3    [  ] Thrombophilia                                             2    [  ] Lower limb paralysis                                    2        (unable to hold up >15 seconds)      [  ] Current Cancer                                             2         (within 6 months)    [  ] Immobilization > 24 hrs                              1    [  ] ICU/CCU stay > 24 hours                            1    [ x ] Age > 60                                                    1    IMPROVE VTE Score ___1______

## 2020-01-09 NOTE — CHART NOTE - NSCHARTNOTEFT_GEN_A_CORE
Pt seen/ examined at bed side.  admitted by nocturnist over night   Pt found to be + for RSV  admitted and treated for acute bronchitis, CXR neg for pna.  UA only 3-5 wbc, d/c CTX , procalcitonin normal.

## 2020-01-09 NOTE — H&P ADULT - NSHPLABSRESULTS_GEN_ALL_CORE
LABS:                        13.6   3.95  )-----------( 246      ( 2020 20:28 )             44.1         140  |  106  |  12  ----------------------------<  94  4.4   |  29  |  1.17    Ca    9.0      2020 20:28    TPro  7.8  /  Alb  3.6  /  TBili  0.7  /  DBili  x   /  AST  34  /  ALT  17  /  AlkPhos  95  0108      Urinalysis Basic - ( 2020 21:30 )    Color: Yellow / Appearance: Clear / S.005 / pH: x  Gluc: x / Ketone: Negative  / Bili: Negative / Urobili: Negative mg/dL   Blood: x / Protein: 15 mg/dL / Nitrite: Negative   Leuk Esterase: Moderate / RBC: 0-2 /HPF / WBC 3-5   Sq Epi: x / Non Sq Epi: Few / Bacteria: Few      CAPILLARY BLOOD GLUCOSE  FLU A B RSV Detection by PCR (20 @ 20:28)    Flu A Result: NotDetec: The Flu A B RSV assay is a Real-Time PCR test for the qualitative  detection and differentiation of Influenza A, Influenza B, and  Respiratory Syncytial Virus on nasopharyngeal swabs. The results should  be interpreted in the context of all clinical and laboratory findings.    Flu B Result: NotDetec    RSV Result: Detected                RADIOLOGY & ADDITIONAL TESTS:    < from: Xray Chest 1 View- PORTABLE-Urgent (20 @ 20:36) >      IMPRESSION:    Cardiomegaly. Clear lungs.    < end of copied text >          Imaging Personally Reviewed:  [x ] YES  [ ] NO

## 2020-01-09 NOTE — H&P ADULT - NSHPREVIEWOFSYSTEMS_GEN_ALL_CORE
ROS: negative for chest pain, shortness of breath, abd pain, nausea, vomiting, diarrhea, rash, paresthesia, and focal weakness--all other systems reviewed are negative.

## 2020-01-10 LAB
CULTURE RESULTS: SIGNIFICANT CHANGE UP
SPECIMEN SOURCE: SIGNIFICANT CHANGE UP

## 2020-01-10 PROCEDURE — 99232 SBSQ HOSP IP/OBS MODERATE 35: CPT

## 2020-01-10 RX ORDER — DIPHENHYDRAMINE HCL 50 MG
12.5 CAPSULE ORAL ONCE
Refills: 0 | Status: COMPLETED | OUTPATIENT
Start: 2020-01-10 | End: 2020-01-10

## 2020-01-10 RX ORDER — HALOPERIDOL DECANOATE 100 MG/ML
1 INJECTION INTRAMUSCULAR ONCE
Refills: 0 | Status: COMPLETED | OUTPATIENT
Start: 2020-01-10 | End: 2020-01-10

## 2020-01-10 RX ADMIN — Medication 3 MILLILITER(S): at 00:47

## 2020-01-10 RX ADMIN — Medication 3 MILLILITER(S): at 12:06

## 2020-01-10 RX ADMIN — Medication 12.5 MILLIGRAM(S): at 01:48

## 2020-01-10 RX ADMIN — Medication 2 MILLIGRAM(S): at 23:57

## 2020-01-10 RX ADMIN — Medication 3 MILLILITER(S): at 17:34

## 2020-01-10 RX ADMIN — HEPARIN SODIUM 5000 UNIT(S): 5000 INJECTION INTRAVENOUS; SUBCUTANEOUS at 06:37

## 2020-01-10 RX ADMIN — Medication 0.5 MILLIGRAM(S): at 04:23

## 2020-01-10 RX ADMIN — Medication 3 MILLILITER(S): at 05:24

## 2020-01-10 RX ADMIN — HALOPERIDOL DECANOATE 1 MILLIGRAM(S): 100 INJECTION INTRAMUSCULAR at 01:53

## 2020-01-10 NOTE — CONSULT NOTE ADULT - SUBJECTIVE AND OBJECTIVE BOX
Patient is a 84y old  Female who presents with a chief complaint of 83 y/o Female complaining of flu-like symptoms. (10 Juliano 2020 13:39)    HPI:  83 y/o Female PMH Alzheimer's, HTN, Anemia and PAD,  presented with cold  symptoms  from home, brought in with family, who explain she's had a cough, chills, fever, congestion, body aches, headache, chills for  4 days.  Has + sick contact in family.  Has baseline dementia and has difficulty giving history.  [place] likely she was wheezing.    Non smoker.  RVP positive for RSV.    PAST MEDICAL & SURGICAL HISTORY:  Essential hypertension  Alzheimer disease  History of appendectomy    FAMILY HISTORY:  No pertinent family history in first degree relatives    SOCIAL HISTORY: non smoker    Allergies  No Known Allergies    MEDICATIONS  (STANDING):  albuterol/ipratropium for Nebulization 3 milliLiter(s) Nebulizer every 6 hours  amLODIPine   Tablet 5 milliGRAM(s) Oral daily  heparin  Injectable 5000 Unit(s) SubCutaneous every 12 hours    REVIEW OF SYSTEMS:    Constitutional:            fever, fatigue  HEENT:                      No difficulty hearing, tinnitus, vertigo; No sinus or throat pain  Respiratory:               sob and cough.  Cardiovascular:           No chest pain, palpitations  Gastrointestinal:        No abdominal or epigastric pain. No N/V/diarrhea or hematemesis  Genitourinary:            No dysuria, frequency, hematuria or incontinence  SKIN:                          no rash  Musculoskeletal:        No joint pain or swelling  Extremities:                No swelling  Neurological:              No headaches  Psychiatric:                 No depression, anxiety    MACRA & MIPS:  Vaccines - Influenza: no and Pneumovax: no  Tobacco:  non smoker  Blood Pressure Screening / Control of: 170/90  Current Medications Reviewed: yes    Vital Signs Last 24 Hrs  T(C): 36.7 (10 Juliano 2020 16:49), Max: 37 (2020 18:35)  T(F): 98 (10 Juliano 2020 16:49), Max: 98.6 (2020 18:35)  HR: 80 (10 Juliano 2020 18:17) (63 - 97)  BP: 115/61 (10 Juliano 2020 16:49) (107/56 - 170/90)  BP(mean): --  RR: 17 (10 Juliano 2020 16:49) (16 - 18)  SpO2: 97% (10 Juliano 2020 18:17) (95% - 100%)    PHYSICAL EXAM:  GEN:         Awake, responsive and comfortable.  HEENT:    Normal.    RESP:       decreased air entry.  CVS:          Regular rate and rhythm.   ABD:         Soft, non-tender, non-distended;   :             No costovertebral angle tenderness  SKIN:           Warm and dry.  EXTR:            No clubbing, cyanosis or edema  CNS:              Intact sensory and motor function.  PSYCH:        cooperative, no anxiety or depression    LABS:                        12.7   3.74  )-----------( 244      ( 2020 06:26 )             41.1         139  |  106  |  9   ----------------------------<  92  3.5   |  27  |  0.89    Ca    8.8      2020 06:26    TPro  7.8  /  Alb  3.6  /  TBili  0.7  /  DBili  x   /  AST  34  /  ALT  17  /  AlkPhos  95      Urinalysis Basic - ( 2020 21:30 )    Color: Yellow / Appearance: Clear / S.005 / pH: x  Gluc: x / Ketone: Negative  / Bili: Negative / Urobili: Negative mg/dL   Blood: x / Protein: 15 mg/dL / Nitrite: Negative   Leuk Esterase: Moderate / RBC: 0-2 /HPF / WBC 3-5   Sq Epi: x / Non Sq Epi: Few / Bacteria: Few    Culture - Urine (collected 20 @ 10:14)  Source: .Urine Clean Catch (Midstream)  Final Report (01-10-20 @ 17:19):    >=3 organisms. Probable collection contamination.    RADIOLOGY & ADDITIONAL STUDIES:  < from: Xray Chest 1 View- PORTABLE-Urgent (20 @ 20:36) >    EXAM:  XR CHEST PORTABLE URGENT 1V                          PROCEDURE DATE:  2020      INTERPRETATION:  cough    A single portable radiograph suggests cardiomegaly, although there may be magnification from technique..     No acute congestive changes are seen.     The lungs are clear without evidence for infiltrate or consolidation.     No pleural fluid is evident.     A tortuous aorta is noted.    IMPRESSION:    Cardiomegaly. Clear lungs.    BRIAN INTERIANO M.D., ATTENDING RADIOLOGIST  This document has been electronically signed. 2020  8:46PM      ASSESSMENT AND PLAN:  ·	SOB.  ·	RSV tracheobronchitis  ·	Cough.  ·	HTN    Nebulizer.  BP control.  Replace potassium.  Flu and pneumovax.  Discussed with daughter at bed side.

## 2020-01-10 NOTE — PROGRESS NOTE ADULT - ASSESSMENT
83 y/o Female PMH alzheimer's dementia, HTN, anemia a/w c/o flu like symptoms  from home, pt has cough, chills, fever, congestion, body aches, headache, for  4 days. hx exposure to sick contact.  Pt found to have RSV infection , being optimized for RSV bronchitis. Likely d/c plan in am if stable.

## 2020-01-10 NOTE — PROGRESS NOTE ADULT - SUBJECTIVE AND OBJECTIVE BOX
CHIEF COMPLAINT/INTERVAL HISTORY:    Patient is a 84y old  Female who presents with a chief complaint of 85 y/o Female complaining of flu-like symptoms. (2020 01:13)      HPI:  85 y/o Female PMH alzheimer's, HTN, anemia presents with flu symptoms  from home, brought in with family, who explain she's had a cough, chills, fever, congestion, body aches, headache, chills for  4 days.  Has + sick contact.  Pt. has baseline dementia and has difficulty giving history.  Pt. felt likely she was wheezing.  No other complaints or inciting event.  Pt. feels well otherwise. She denies CP, SOB, wheezing, headache at this time. (2020 01:13)      SUBJECTIVE & OBJECTIVE: Pt seen and examined at bedside. currently calm/ co operative, denies any complaints, per staff pt had agitation last night / ?sun downing.  poor historian, denies any complaints.     Vital Signs Last 24 Hrs  T(C): 36.3 (10 Juliano 2020 11:27), Max: 37.2 (2020 16:38)  T(F): 97.4 (10 Juliano 2020 11:27), Max: 99 (2020 16:38)  HR: 75 (10 Juliano 2020 12:51) (69 - 97)  BP: 170/90 (10 Juliano 2020 11:27) (107/56 - 170/90)  BP(mean): --  ABP: --  ABP(mean): --  RR: 18 (10 Juliano 2020 11:27) (16 - 18)  SpO2: 98% (10 Juliano 2020 12:51) (95% - 99%)        MEDICATIONS  (STANDING):  albuterol/ipratropium for Nebulization 3 milliLiter(s) Nebulizer every 6 hours  amLODIPine   Tablet 5 milliGRAM(s) Oral daily  heparin  Injectable 5000 Unit(s) SubCutaneous every 12 hours        PHYSICAL EXAM:    GENERAL: NAD, well-developed  HEAD:  Atraumatic, Normocephalic  EYES: EOMI, PERRLA, conjunctiva and sclera clear  ENMT: Moist mucous membranes  NECK: Supple, No JVD  NERVOUS SYSTEM:  Alert , awake, forgetful, interactive, follows  commands  CHEST/LUNG: Clear to auscultation bilaterally; No rales, rhonchi, wheezing, or rubs  HEART: S1, S2  ABDOMEN: Soft, Nontender, Nondistended; Bowel sounds present  EXTREMITIES:  no cyanosis, or edema    LABS:                        12.7   3.74  )-----------( 244      ( 2020 06:26 )             41.1         139  |  106  |  9   ----------------------------<  92  3.5   |  27  |  0.89    Ca    8.8      2020 06:26    TPro  7.8  /  Alb  3.6  /  TBili  0.7  /  DBili  x   /  AST  34  /  ALT  17  /  AlkPhos  95  01-08      Urinalysis Basic - ( 2020 21:30 )    Color: Yellow / Appearance: Clear / S.005 / pH: x  Gluc: x / Ketone: Negative  / Bili: Negative / Urobili: Negative mg/dL   Blood: x / Protein: 15 mg/dL / Nitrite: Negative   Leuk Esterase: Moderate / RBC: 0-2 /HPF / WBC 3-5   Sq Epi: x / Non Sq Epi: Few / Bacteria: Few

## 2020-01-11 PROCEDURE — 99232 SBSQ HOSP IP/OBS MODERATE 35: CPT

## 2020-01-11 RX ORDER — LANOLIN ALCOHOL/MO/W.PET/CERES
3 CREAM (GRAM) TOPICAL ONCE
Refills: 0 | Status: COMPLETED | OUTPATIENT
Start: 2020-01-11 | End: 2020-01-11

## 2020-01-11 RX ORDER — AMLODIPINE BESYLATE 2.5 MG/1
10 TABLET ORAL DAILY
Refills: 0 | Status: DISCONTINUED | OUTPATIENT
Start: 2020-01-12 | End: 2020-01-13

## 2020-01-11 RX ADMIN — Medication 3 MILLILITER(S): at 11:41

## 2020-01-11 RX ADMIN — HEPARIN SODIUM 5000 UNIT(S): 5000 INJECTION INTRAVENOUS; SUBCUTANEOUS at 17:05

## 2020-01-11 RX ADMIN — Medication 3 MILLILITER(S): at 23:36

## 2020-01-11 RX ADMIN — Medication 3 MILLIGRAM(S): at 23:32

## 2020-01-11 RX ADMIN — Medication 3 MILLILITER(S): at 05:41

## 2020-01-11 RX ADMIN — Medication 3 MILLILITER(S): at 17:39

## 2020-01-11 RX ADMIN — Medication 3 MILLILITER(S): at 00:04

## 2020-01-11 NOTE — PROGRESS NOTE ADULT - ASSESSMENT
85 y/o Female PMH alzheimer's dementia, HTN, anemia a/w c/o flu like symptoms  from home, pt has cough, chills, fever, congestion, body aches, headache, for  4 days. hx exposure to sick contact.  Pt found to have RSV infection , being optimized for RSV bronchitis.

## 2020-01-11 NOTE — PROGRESS NOTE ADULT - SUBJECTIVE AND OBJECTIVE BOX
INTERVAL HPI:  85 y/o Female PMH Alzheimer's, HTN, Anemia and PAD,  presented with cold  symptoms  from home, brought in with family, who explain she's had a cough, chills, fever, congestion, body aches, headache, chills for  4 days.  Has + sick contact in family.  Has baseline dementia and has difficulty giving history.  [place] likely she was wheezing.    Non smoker.  RVP positive for RSV.    OVERNIGHT EVENTS:  Out of bed to chair and comfortable.    Vital Signs Last 24 Hrs  T(C): 36.8 (11 Jan 2020 11:15), Max: 37.1 (10 Juliano 2020 23:45)  T(F): 98.2 (11 Jan 2020 11:15), Max: 98.7 (10 Juliano 2020 23:45)  HR: 80 (11 Jan 2020 11:15) (57 - 85)  BP: 150/75 (11 Jan 2020 11:15) (150/75 - 163/80)  BP(mean): --  RR: 17 (11 Jan 2020 11:15) (17 - 18)  SpO2: 98% (11 Jan 2020 11:15) (93% - 98%)    PHYSICAL EXAM:  GEN:         Awake, responsive and comfortable.  HEENT:    Normal.    RESP:       no wheezing.  CVS:             Regular rate and rhythm.   ABD:         Soft, non-tender, non-distended;   :             No costovertebral angle tenderness  EXTR:            No clubbing, cyanosis or edema  CNS:              Intact sensory and motor function.    MEDICATIONS  (STANDING):  albuterol/ipratropium for Nebulization 3 milliLiter(s) Nebulizer every 6 hours  heparin  Injectable 5000 Unit(s) SubCutaneous every 12 hours    ASSESSMENT AND PLAN:  ·	SOB.  ·	RSV tracheobronchitis  ·	Cough.    Pulmonary stable.  Continue nebulizer as needed.  Discharge planning.

## 2020-01-11 NOTE — PROGRESS NOTE ADULT - PROBLEM SELECTOR PLAN 1
RSV tracheobronchitis   off Oxygen  doing well sitting in chair   O2 sat 98% on RA   pulm following   PT eval appreciated, patient can return home on discharge, no needs

## 2020-01-12 DIAGNOSIS — Z29.9 ENCOUNTER FOR PROPHYLACTIC MEASURES, UNSPECIFIED: ICD-10-CM

## 2020-01-12 PROCEDURE — 99239 HOSP IP/OBS DSCHRG MGMT >30: CPT

## 2020-01-12 RX ORDER — AMLODIPINE BESYLATE 2.5 MG/1
1 TABLET ORAL
Qty: 30 | Refills: 0
Start: 2020-01-12

## 2020-01-12 RX ADMIN — AMLODIPINE BESYLATE 10 MILLIGRAM(S): 2.5 TABLET ORAL at 13:09

## 2020-01-12 RX ADMIN — Medication 3 MILLILITER(S): at 17:55

## 2020-01-12 RX ADMIN — Medication 3 MILLILITER(S): at 11:17

## 2020-01-12 RX ADMIN — Medication 3 MILLILITER(S): at 05:30

## 2020-01-12 RX ADMIN — HEPARIN SODIUM 5000 UNIT(S): 5000 INJECTION INTRAVENOUS; SUBCUTANEOUS at 06:27

## 2020-01-12 RX ADMIN — Medication 3 MILLILITER(S): at 23:20

## 2020-01-12 RX ADMIN — HEPARIN SODIUM 5000 UNIT(S): 5000 INJECTION INTRAVENOUS; SUBCUTANEOUS at 17:07

## 2020-01-12 NOTE — DISCHARGE NOTE PROVIDER - CARE PROVIDER_API CALL
PCP,   [facility]   [address] [place], [place]  [id number] [phone]  Phone: (   )    -  Fax: (   )    -  Follow Up Time: 1 week

## 2020-01-12 NOTE — DISCHARGE NOTE PROVIDER - PROVIDER TOKENS
FREE:[LAST:[PCP],PHONE:[(   )    -],FAX:[(   )    -],ADDRESS:[[facility]   [address] [place] [address]   [phone]],FOLLOWUP:[1 week]]

## 2020-01-12 NOTE — DISCHARGE NOTE PROVIDER - NSDCCPCAREPLAN_GEN_ALL_CORE_FT
PRINCIPAL DISCHARGE DIAGNOSIS  Diagnosis: RSV infection  Assessment and Plan of Treatment: upper respiratory infection      SECONDARY DISCHARGE DIAGNOSES  Diagnosis: Late onset Alzheimer disease  Assessment and Plan of Treatment:     Diagnosis: Essential hypertension  Assessment and Plan of Treatment: Essential hypertension    Diagnosis: Altered mental status, unspecified altered mental status type  Assessment and Plan of Treatment:

## 2020-01-12 NOTE — PROGRESS NOTE ADULT - SUBJECTIVE AND OBJECTIVE BOX
INTERVAL HPI:  85 y/o Female PMH Alzheimer's, HTN, Anemia and PAD,  presented with cold  symptoms  from home, brought in with family, who explain she's had a cough, chills, fever, congestion, body aches, headache, chills for  4 days.  Has + sick contact in family.  Has baseline dementia and has difficulty giving history.  [place] likely she was wheezing.    Non smoker.  RVP positive for RSV.    OVERNIGHT EVENTS:  Agitated earlier.    Vital Signs Last 24 Hrs  T(C): 36.2 (12 Jan 2020 17:19), Max: 36.7 (12 Jan 2020 06:08)  T(F): 97.1 (12 Jan 2020 17:19), Max: 98 (12 Jan 2020 06:08)  HR: 71 (12 Jan 2020 17:57) (60 - 100)  BP: 107/64 (12 Jan 2020 17:19) (107/64 - 129/96)  BP(mean): --  RR: 18 (12 Jan 2020 17:19) (16 - 18)  SpO2: 96% (12 Jan 2020 17:57) (94% - 98%)    PHYSICAL EXAM:  GEN:         Awake, responsive and comfortable.  HEENT:    Normal.    RESP:         no distress  CVS:             Regular rate and rhythm.   ABD:         Soft, non-tender, non-distended;     MEDICATIONS  (STANDING):  albuterol/ipratropium for Nebulization 3 milliLiter(s) Nebulizer every 6 hours  amLODIPine   Tablet 10 milliGRAM(s) Oral daily  heparin  Injectable 5000 Unit(s) SubCutaneous every 12 hours    ASSESSMENT AND PLAN:  ·	SOB.  ·	RSV tracheobronchitis  ·	Cough.    Stable pulmonary status.  Continue nebulizer.

## 2020-01-12 NOTE — DISCHARGE NOTE PROVIDER - HOSPITAL COURSE
83 y/o Female PMH alzheimer's dementia, HTN, anemia a/w c/o flu like symptoms  from home, pt has cough, chills, fever, congestion, body aches, headache, for  4 days. hx exposure to sick contact.  Pt found to have RSV bronchitis.         SPOKE WITH PATIENT'S DAUGHTER, SHE STATED THAT SHE WILL BE LIVING WITH PATIENT UNTIL THEY ARE APPROVED FOR HHA.     DAUGHTER FILLING OUT PAPERWORK FOR HHA.         DISCHARGE DIAGNOSES:            Problem/Plan - 1:    ·  Problem: RSV infection.  Plan: RSV tracheobronchitis     off Oxygen  doing well sitting in chair     O2 sat 98% on RA     PT eval appreciated, patient can return home on discharge, no needs.         Problem/Plan - 2:    ·  Problem: Alzheimer's dementia without behavioral disturbance, unspecified timing of dementia onset.  supportive care         Problem/Plan - 3:    ·  Problem: Essential hypertension.  Plan: Rx Norvasc 5mg daily         Problem/Plan - 4:    ·  Problem: R/O Urinary tract infection with hematuria, site unspecified.  Plan: unlikely UTI, UA bland, PCT negative, d/c abx         RETURN PARAMETERS DISCUSSED WITH PATIENT's daughter, she EXPRESSED UNDERSTANDING AND IS AGREEABLE.        Discharge time : 40 min

## 2020-01-13 VITALS — OXYGEN SATURATION: 96 %

## 2020-01-13 LAB
CULTURE RESULTS: SIGNIFICANT CHANGE UP
SPECIMEN SOURCE: SIGNIFICANT CHANGE UP

## 2020-01-13 PROCEDURE — 99232 SBSQ HOSP IP/OBS MODERATE 35: CPT

## 2020-01-13 RX ORDER — SODIUM CHLORIDE 9 MG/ML
500 INJECTION INTRAMUSCULAR; INTRAVENOUS; SUBCUTANEOUS ONCE
Refills: 0 | Status: DISCONTINUED | OUTPATIENT
Start: 2020-01-13 | End: 2020-01-13

## 2020-01-13 RX ADMIN — HEPARIN SODIUM 5000 UNIT(S): 5000 INJECTION INTRAVENOUS; SUBCUTANEOUS at 05:09

## 2020-01-13 RX ADMIN — Medication 3 MILLILITER(S): at 11:08

## 2020-01-13 RX ADMIN — AMLODIPINE BESYLATE 10 MILLIGRAM(S): 2.5 TABLET ORAL at 05:15

## 2020-01-13 NOTE — PROGRESS NOTE ADULT - REASON FOR ADMISSION
83 y/o Female complaining of flu-like symptoms.
83 y/o Female complaining of flu-like symptoms.
85 y/o Female complaining of flu-like symptoms.
83 y/o Female complaining of flu-like symptoms.
85 y/o Female complaining of flu-like symptoms.

## 2020-01-13 NOTE — PROGRESS NOTE ADULT - SUBJECTIVE AND OBJECTIVE BOX
HPI:  85 y/o Female PMH alzheimer's, HTN, anemia presents with flu symptoms  from home, brought in with family, who explain she's had a cough, chills, fever, congestion, body aches, headache, chills for  4 days.  Has + sick contact.  Pt. has baseline dementia and has difficulty giving history.  Pt. felt likely she was wheezing.  No other complaints or inciting event.  Pt. feels well otherwise. She denies CP, SOB, wheezing, headache at this time. (2020 01:13)      SUBJECTIVE & OBJECTIVE: Pt seen and examined at bedside.   poor historian, denies any complaints.     Vital Signs Last 24 Hrs  T(C): 36.6 (2020 10:35), Max: 36.6 (2020 05:13)  T(F): 97.8 (2020 10:35), Max: 97.9 (2020 05:13)  HR: 70 (2020 11:44) (70 - 102)  BP: 116/60 (2020 10:35) (107/64 - 141/69)  BP(mean): 81 (2020 10:35) (81 - 81)  RR: 17 (2020 10:35) (16 - 18)  SpO2: 96% (2020 11:44) (96% - 97%)    MEDICATIONS  (STANDING):  albuterol/ipratropium for Nebulization 3 milliLiter(s) Nebulizer every 6 hours  amLODIPine   Tablet 5 milliGRAM(s) Oral daily  heparin  Injectable 5000 Unit(s) SubCutaneous every 12 hours        PHYSICAL EXAM:    GENERAL: NAD, well-developed  HEAD:  Atraumatic, Normocephalic  EYES: EOMI, PERRLA, conjunctiva and sclera clear  ENMT: Moist mucous membranes  NECK: Supple, No JVD  NERVOUS SYSTEM:  AOx1 (self) , awake,  follows  commands, moving extremities   CHEST/LUNG: Clear to auscultation bilaterally; No rales, rhonchi, wheezing, or rubs  HEART: S1, S2  ABDOMEN: Soft, Nontender, Nondistended; Bowel sounds present  EXTREMITIES:  no cyanosis, or edema b/l     LABS:  no new labs     Urinalysis Basic - ( 2020 21:30 )    Color: Yellow / Appearance: Clear / S.005 / pH: x  Gluc: x / Ketone: Negative  / Bili: Negative / Urobili: Negative mg/dL   Blood: x / Protein: 15 mg/dL / Nitrite: Negative   Leuk Esterase: Moderate / RBC: 0-2 /HPF / WBC 3-5   Sq Epi: x / Non Sq Epi: Few / Bacteria: Few    Consultant(s) Notes Reveiwed [x ] Yes     Care Discussed with [ x] Consultants  [ ] Patient  [x ] Family  [ ] /   [ x] Other; RN

## 2020-01-13 NOTE — PROGRESS NOTE ADULT - ASSESSMENT
83 y/o Female PMH alzheimer's dementia, HTN, anemia a/w c/o flu like symptoms  from home, pt has cough, chills, fever, congestion, body aches, headache, for  4 days. hx exposure to sick contact.  Pt found to have RSV infection , being optimized for RSV bronchitis.     patient for discharge today,   home with daughter who is going to live with her

## 2020-01-13 NOTE — DISCHARGE NOTE NURSING/CASE MANAGEMENT/SOCIAL WORK - PATIENT PORTAL LINK FT
You can access the FollowMyHealth Patient Portal offered by [facility] by registering at the following website: [email or web address]. By joining [employer]’s FollowMyHealth portal, you will also be able to view your health information using other applications (apps) compatible with our system.

## 2020-01-16 DIAGNOSIS — J20.5 ACUTE BRONCHITIS DUE TO RESPIRATORY SYNCYTIAL VIRUS: ICD-10-CM

## 2020-01-16 DIAGNOSIS — I10 ESSENTIAL (PRIMARY) HYPERTENSION: ICD-10-CM

## 2020-01-16 DIAGNOSIS — F02.80 DEMENTIA IN OTHER DISEASES CLASSIFIED ELSEWHERE, UNSPECIFIED SEVERITY, WITHOUT BEHAVIORAL DISTURBANCE, PSYCHOTIC DISTURBANCE, MOOD DISTURBANCE, AND ANXIETY: ICD-10-CM

## 2020-01-16 DIAGNOSIS — N30.00 ACUTE CYSTITIS WITHOUT HEMATURIA: ICD-10-CM

## 2020-01-16 DIAGNOSIS — G30.1 ALZHEIMER'S DISEASE WITH LATE ONSET: ICD-10-CM

## 2020-01-16 DIAGNOSIS — D64.9 ANEMIA, UNSPECIFIED: ICD-10-CM

## 2023-05-25 NOTE — ED ADULT TRIAGE NOTE - HEART RATE (BEATS/MIN)
56 Advancement Flap (Double) Text: The defect edges were debeveled with a #15 scalpel blade.  Given the location of the defect and the proximity to free margins a double advancement flap was deemed most appropriate.  Using a sterile surgical marker, the appropriate advancement flaps were drawn incorporating the defect and placing the expected incisions within the relaxed skin tension lines where possible.    The area thus outlined was incised deep to adipose tissue with a #15 scalpel blade.  The skin margins were undermined to an appropriate distance in all directions utilizing iris scissors.

## 2023-10-11 NOTE — ED ADULT NURSE NOTE - CAS TRG GENERAL NORM CIRC DET
Addended by: SAMMY MAJOR on: 10/11/2023 03:05 PM     Modules accepted: Orders     Strong peripheral pulses

## 2025-02-09 NOTE — PATIENT PROFILE ADULT - VISION (WITH CORRECTIVE LENSES IF THE PATIENT USUALLY WEARS THEM):
Normal vision: sees adequately in most situations; can see medication labels, newsprint
For information on Fall & Injury Prevention, visit: [email or web address]  [email or web address] OR  https://www.cdc.gov/steadi/patient.html
Vaccine status unknown

## 2025-04-08 NOTE — ED ADULT NURSE NOTE - NS ED NOTE ABUSE RESPONSE YN
Hold PTA BP medication to promote hypertension with SBP up to 210 mmHg  BP stable since admission, continue to monitor   Yes

## 2025-06-19 NOTE — PHYSICAL THERAPY INITIAL EVALUATION ADULT - REFERRING PHYSICIAN, REHAB EVAL
The clinical goals for the shift include pt hr will be wnl this shift    Over the shift, the patient did not make progress toward the following goals. Barriers to progression include tachycardia and hypotension. Medications ordered and given.     JOANNA steward